# Patient Record
(demographics unavailable — no encounter records)

---

## 2024-11-13 NOTE — PROCEDURE
[Lesion] : lesion identified by mirror examination needing further evaluation [None] : none [Glottis Arytenoid Cartilages Erythema] : bilateral arytenoid ~M erythema [Normal] : posterior cricoid area had healthy pink mucosa in the interarytenoid area and the esophageal inlet

## 2024-11-13 NOTE — REASON FOR VISIT
[Subsequent Evaluation] : a subsequent evaluation for [FreeTextEntry2] : clogged ears, bilateral impacted cerumen

## 2024-11-13 NOTE — HISTORY OF PRESENT ILLNESS
[FreeTextEntry1] : Patient presents today c/o clogged ears, bilateral impacted cerumen .  Patient has no other ear complaints, Pt has been trying to follow diet for LPRD. Pt has minimal throat symptoms. [Hearing Loss] : no hearing loss [Ear Fullness] : ear fullness [Tinnitus] : no tinnitus [Vertigo] : no vertigo [None] : No associated symptoms are reported.

## 2024-11-13 NOTE — PHYSICAL EXAM
[de-identified] : .wax obstructing  B/L cerumen impaction removed with curette and suction [Normal] : external appearance is normal

## 2025-05-28 NOTE — PHYSICAL EXAM
[Normal] : mucosa is normal [Midline] : trachea located in midline position [de-identified] :  B/L ear cerumen removed with curette. Well tolerated. Reports improvement of clogged symptoms.

## 2025-05-28 NOTE — ASSESSMENT
[FreeTextEntry1] : Pt was counselled on a low acid and appropriate dietary modifications. Pt will notify GI to perform upper endoscopy.

## 2025-05-28 NOTE — REASON FOR VISIT
[Subsequent Evaluation] : a subsequent evaluation for [FreeTextEntry2] : B/L impacted cerumen, LPRD, vocal cord granuloma

## 2025-05-28 NOTE — HISTORY OF PRESENT ILLNESS
[FreeTextEntry1] :  Patient presents for follow up for B/L impacted cerumen, LPRD, vocal cord granuloma. Here for routine ear cleaning. Denies any throat complaints.